# Patient Record
Sex: FEMALE | Race: WHITE | NOT HISPANIC OR LATINO
[De-identification: names, ages, dates, MRNs, and addresses within clinical notes are randomized per-mention and may not be internally consistent; named-entity substitution may affect disease eponyms.]

---

## 2022-07-25 PROBLEM — Z00.00 ENCOUNTER FOR PREVENTIVE HEALTH EXAMINATION: Status: ACTIVE | Noted: 2022-07-25

## 2022-08-17 ENCOUNTER — NON-APPOINTMENT (OUTPATIENT)
Age: 47
End: 2022-08-17

## 2022-08-17 ENCOUNTER — APPOINTMENT (OUTPATIENT)
Dept: INFECTIOUS DISEASE | Facility: CLINIC | Age: 47
End: 2022-08-17

## 2022-08-17 VITALS
TEMPERATURE: 99 F | HEART RATE: 82 BPM | OXYGEN SATURATION: 99 % | BODY MASS INDEX: 21.44 KG/M2 | RESPIRATION RATE: 15 BRPM | HEIGHT: 63 IN | WEIGHT: 121 LBS | DIASTOLIC BLOOD PRESSURE: 82 MMHG | SYSTOLIC BLOOD PRESSURE: 118 MMHG

## 2022-08-17 DIAGNOSIS — M25.512 PAIN IN LEFT SHOULDER: ICD-10-CM

## 2022-08-17 DIAGNOSIS — M25.50 PAIN IN UNSPECIFIED JOINT: ICD-10-CM

## 2022-08-17 DIAGNOSIS — D69.3 IMMUNE THROMBOCYTOPENIC PURPURA: ICD-10-CM

## 2022-08-17 PROCEDURE — 99242 OFF/OP CONSLTJ NEW/EST SF 20: CPT

## 2022-08-17 RX ORDER — DOXYCYCLINE HYCLATE 100 MG/1
100 CAPSULE ORAL
Qty: 28 | Refills: 0 | Status: ACTIVE | COMMUNITY
Start: 2022-08-17 | End: 1900-01-01

## 2022-08-17 NOTE — PHYSICAL EXAM
[General Appearance - Alert] : alert [General Appearance - In No Acute Distress] : in no acute distress [General Appearance - Well Nourished] : well nourished [General Appearance - Well-Appearing] : healthy appearing [PERRL With Normal Accommodation] : pupils were equal in size, round, reactive to light [Extraocular Movements] : extraocular movements were intact [Hearing Threshold Finger Rub Not [place]] : hearing was normal [Neck Appearance] : the appearance of the neck was normal [Neck Cervical Mass (___cm)] : no neck mass was observed [Respiration, Rhythm And Depth] : normal respiratory rhythm and effort [Auscultation Breath Sounds / Voice Sounds] : lungs were clear to auscultation bilaterally [Heart Rate And Rhythm] : heart rate was normal and rhythm regular [Heart Sounds] : normal S1 and S2 [Edema] : there was no peripheral edema [Abdomen Soft] : soft [No Palpable Adenopathy] : no palpable adenopathy [Musculoskeletal - Swelling] : no joint swelling [Skin Color & Pigmentation] : normal skin color and pigmentation [] : no rash [Cranial Nerves] : cranial nerves 2-12 were intact [Deep Tendon Reflexes (DTR)] : deep tendon reflexes were 2+ and symmetric [No Focal Deficits] : no focal deficits [Oriented To Time, Place, And Person] : oriented to person, place, and time [Affect] : the affect was normal

## 2022-08-17 NOTE — HISTORY OF PRESENT ILLNESS
[FreeTextEntry1] : 46-year-old female referred by her hematologist today for evaluation of Lyme disease.  Patient has a history of underlying ITP diagnosed during pregnancy that responded to treatment with Rituxan and has not any current treatment.  She states she had Lyme disease in June 2021 after coming down with an acute febrile illness with myalgias she does not recall having a rash at that time Lyme testing was ultimately positive and she was treated with over 4-week course of oral doxycycline with resolution of symptoms.  She notes that over the last month she has had increasing stiffness in her arms and legs finding it hard time sometimes to get out of a chair.  She has pain in her ankles knees and elbows.  Repeat Lyme serology was done in the hematology office which showed a positive Western blot IgG and a negative Western blot IgM.\par Patient is a third  who lives in [place] she herself does not recall ever having tick bites she does often also visit her parents who live in [place] and [facility] area and their yard is heavily [place].  She has 2 teenage children she is  she is a non-smoker no history of substance abuse.\par Family history notable for father with rheumatoid arthritis as well as a history of pancreatic cancer.\par She has no antibiotic allergies.

## 2022-08-17 NOTE — REASON FOR VISIT
[Consultation] : a consultation visit [FreeTextEntry1] : New pt referred by Hematologist/[facility], Gigi Coyle, for + Lyme IgG/ hx Lyme..\par reports she was on Doxycycline x 1-2 months in June 2021; not currently on antibiotics\par c/o joint pains (BL ankles, knees, and elbows) since March 2022- otherwise feels well

## 2022-08-17 NOTE — ASSESSMENT
[FreeTextEntry1] : 46-year-old female with a history of ITP not currently active having been treated with Rituxan many years ago and a prior history of Lyme disease in 2021 has persistent arthritis and discomfort in multiple joints.  Laboratory data was reviewed extensively and discussed with the patient is unclear whether this represents ongoing seropositivity from previous infection versus new infection as she resides in an endemic area.  I suspect this is not likely reactivated or chronic Lyme disease.  Given the fact that the symptoms have recurred recently and given the seasonal occurrence which is consistent with when Lyme incidence is at its highest risk-benefit ratio favors treating the patient with a course of antibiotics.\par \par Recommendations\par 1.  Doxycycline 100 mg p.o. twice daily for 14 days\par 2.  Would hold on drawing further Lyme studies\par 3.  If patient's symptoms persist despite retreatment for Lyme disease she may need further work-up by rheumatologist as there is a history of autoimmune disease in the family.\par But was discussed with the patient agrees with the plan to return to the office in 3 weeks

## 2022-08-17 NOTE — REVIEW OF SYSTEMS
[As Noted in HPI] : as noted in HPI [Joint Stiffness] : joint stiffness [Limb Pain] : limb pain [Fever] : no fever [Body Aches] : no body aches [Feeling Tired] : not feeling tired [Feeling Sick] : not feeling sick [Eyesight Problems] : no eyesight problems [Sore Throat] : no sore throat [Hoarseness] : no hoarseness [Chest Pain] : no chest pain [Palpitations] : no palpitations [Shortness Of Breath] : no shortness of breath [Wheezing] : no wheezing [Cough] : no cough [Abdominal Pain] : no abdominal pain [Constipation] : no constipation [Diarrhea] : no diarrhea [Joint Swelling] : no joint swelling [Skin Lesions] : no skin lesions [Skin Wound] : no skin wound [Confused] : no confusion [Dizziness] : no dizziness [Limb Weakness] : no limb weakness [Anxiety] : no anxiety [Swollen Glands] : no swollen glands

## 2022-09-07 ENCOUNTER — APPOINTMENT (OUTPATIENT)
Dept: INFECTIOUS DISEASE | Facility: CLINIC | Age: 47
End: 2022-09-07

## 2022-09-07 VITALS
DIASTOLIC BLOOD PRESSURE: 68 MMHG | SYSTOLIC BLOOD PRESSURE: 106 MMHG | HEART RATE: 80 BPM | TEMPERATURE: 98.2 F | OXYGEN SATURATION: 99 %

## 2022-09-07 DIAGNOSIS — Z86.19 PERSONAL HISTORY OF OTHER INFECTIOUS AND PARASITIC DISEASES: ICD-10-CM

## 2022-09-07 PROCEDURE — 99213 OFFICE O/P EST LOW 20 MIN: CPT

## 2022-09-07 RX ORDER — DOXYCYCLINE HYCLATE 100 MG/1
100 CAPSULE ORAL
Qty: 28 | Refills: 0 | Status: ACTIVE | COMMUNITY
Start: 2022-09-07 | End: 1900-01-01

## 2022-09-07 NOTE — HISTORY OF PRESENT ILLNESS
[FreeTextEntry1] : Patient presents the office today for follow-up.  She is a 46-year-old female who was seen by me 2 weeks ago and prescribed a 2-week course of oral doxycycline for Lyme infection.\par While on the antibiotic she had intermittent times where she had complete resolution of her joint pain but it never completely resolved.\par She has residual pain in her knees symmetrically bilaterally as well as ankles and occasionally shoulders.\par She has had no fever weight loss or night sweats.  She has tolerated the doxycycline without any significant GI effects.

## 2022-09-07 NOTE — REASON FOR VISIT
[Follow-Up: _____] : a [unfilled] follow-up visit [FreeTextEntry1] : 3 week fu, Lyme\par pt completed 14 day course of Doxycycline\par still c/o joint pains

## 2022-09-07 NOTE — ASSESSMENT
[FreeTextEntry1] : 46-year-old female with a history of ITP not currently active having been treated with Rituxan many years ago and a prior history of Lyme disease in 2021 has persistent arthritis and discomfort in multiple joints. Laboratory data was reviewed extensively and discussed with the patient is unclear whether this represents ongoing seropositivity from previous infection versus new infection as she resides in an endemic area. I suspect this is not likely reactivated or chronic Lyme disease\par Her symptoms did not completely respond to doxycycline and I suspect she may have some underlying other arthritic condition such as an autoimmune disease.\par \par Recommendations\par 1.  14 more days of doxycycline to complete a 28-day course\par 2.  Referral was given for rheumatology evaluation regarding possible underlying autoimmune diseases explain the symmetric joint pain involving multiple joints\par \par She will follow-up here on an as-needed basis.  Case discussed with the patient and all questions were answered.

## 2022-09-07 NOTE — PHYSICAL EXAM
[General Appearance - Alert] : alert [General Appearance - In No Acute Distress] : in no acute distress [General Appearance - Well Nourished] : well nourished [Sclera] : the sclera and conjunctiva were normal [Hearing Threshold Finger Rub Not [place]] : hearing was normal [Neck Appearance] : the appearance of the neck was normal [Respiration, Rhythm And Depth] : normal respiratory rhythm and effort [Heart Rate And Rhythm] : heart rate was normal and rhythm regular [Heart Sounds] : normal S1 and S2 [Abdomen Soft] : soft [Abdomen Tenderness] : non-tender [Musculoskeletal - Swelling] : no joint swelling [Motor Tone] : muscle strength and tone were normal [] : no rash [Affect] : the affect was normal

## 2022-09-07 NOTE — REVIEW OF SYSTEMS
[Normal Appetite] : normal appetite [As Noted in HPI] : as noted in HPI [Negative] : Genitourinary [Fever] : no fever [Chills] : no chills [Feeling Tired] : not feeling tired [Feeling Sick] : not feeling sick [Skin Wound] : no skin wound [Itching] : no itching [Easy Bruising] : no tendency for easy bruising [Swollen Glands] : no swollen glands

## 2023-11-27 ENCOUNTER — APPOINTMENT (OUTPATIENT)
Dept: ORTHOPEDIC SURGERY | Facility: CLINIC | Age: 48
End: 2023-11-27
Payer: COMMERCIAL

## 2023-11-27 VITALS — HEIGHT: 60 IN | BODY MASS INDEX: 23.75 KG/M2 | WEIGHT: 121 LBS

## 2023-11-27 DIAGNOSIS — Z78.9 OTHER SPECIFIED HEALTH STATUS: ICD-10-CM

## 2023-11-27 PROCEDURE — 99203 OFFICE O/P NEW LOW 30 MIN: CPT

## 2023-11-27 RX ORDER — CELECOXIB 200 MG/1
200 CAPSULE ORAL
Refills: 0 | Status: ACTIVE | COMMUNITY

## 2023-12-05 ENCOUNTER — RESULT REVIEW (OUTPATIENT)
Age: 48
End: 2023-12-05

## 2023-12-05 ENCOUNTER — NON-APPOINTMENT (OUTPATIENT)
Age: 48
End: 2023-12-05

## 2023-12-15 ENCOUNTER — APPOINTMENT (OUTPATIENT)
Dept: ORTHOPEDIC SURGERY | Facility: CLINIC | Age: 48
End: 2023-12-15
Payer: COMMERCIAL

## 2023-12-15 PROCEDURE — 99213 OFFICE O/P EST LOW 20 MIN: CPT

## 2023-12-15 NOTE — HISTORY OF PRESENT ILLNESS
[de-identified] : 12/15/2023: Follow up patient is here today for her left knee MRI results. States on 12/05/2023 she twisted her left knee and went to [facility] for a xray, they gave her a brace. Admits to taking Celebrex every day with some relief. knee still bothering her.  Date of initial evaluation is 11/27/2023 Patient age is 48  Occupation is Teacher  Body part causing symptoms is the LT knee  Symptoms began about 4 weeks ago, felt sharp knee pain after running. has not been able to run since that time. difficulty bending the knee.  Location of pain is medial Quality of pain is dull Pain score at rest is 2/10 Pain score during activity is 8/10 Radicular symptoms are not present Prior treatments include rest and celebrex without relief She had outside XR Patient's condition is not associated with workers compensation, no-fault or interscholastic athletics

## 2023-12-15 NOTE — IMAGING
[Left] : left knee [All Views] : anteroposterior, lateral, skyline, and anteroposterior standing [Outside films reviewed] : Outside films reviewed [There are no fractures, subluxations or dislocations. No significant abnormalities are seen] : There are no fractures, subluxations or dislocations. No significant abnormalities are seen [de-identified] : (Exam: Knee)   Laterality is left   Patient is in no acute distress, alert and oriented Sensation is grossly intact to light touch in the foot Motor function is 5/5 in the foot Capillary refill is less than 2 seconds in the toes Lymphadenopathy is not present Peripheral edema is not present   Skin is intact Effusion is not present Atrophy is not present Antalgic gait is not present   Medial joint line tenderness is present Lateral joint line tenderness is not present Patellar tenderness is not present Calf tenderness is not present   Knee extension is 0 Knee flexion is 135   Quadriceps strength is 5/5 Hamstring strength is 5/5   Lachman is normal Posterior drawer is normal Varus stress stability is normal Valgus stress stability is normal   Medial Adele test is abnormal Lateral Adele test is normal Patellofemoral grind test is normal Patellar apprehension test is normal

## 2023-12-15 NOTE — DISCUSSION/SUMMARY
[de-identified] : History, clinical examination and imaging were most consistent with: -left knee medial meniscus root tear  The diagnosis was explained in detail. The potential non-surgical and surgical treatments were reviewed. The relative risks and benefits of each option were considered relative to the patients age, activity level, medical history, symptom severity and previously attempted treatments.  The patient was advised to consult with their primary medical provider prior to initiation of any new medications to reduce the risk of adverse effects specific to their long-term home medications and medical history. The risk of gastrointestinal irritation and kidney injury specific to long-term NSAID use was discussed.  -Discussed option for medial meniscal root repair with partial lateral meniscectomy, patient would like to attempt non-surgical treatment at this time. -Patient to begin formal PT. -Discussed association of meniscal root tears with future osteoarthritis. -Return to activity as symptoms permit. -Celebrex as needed for pain. -Follow up with Dr. Cabrera in 6 weeks, if symptoms persist would likely revisit surgical option.   (MDM)  Problem Complexity -Moderate: acute, complicated injury

## 2023-12-15 NOTE — DATA REVIEWED
[MRI] : MRI [Left] : left [Knee] : knee [Report was reviewed and noted in the chart] : The report was reviewed and noted in the chart [I reviewed the films/CD and agree] : I reviewed the films/CD and agree [FreeTextEntry1] : medial meniscus root tear, degenerative tear anterior horn lateral meniscus, low grade ACL sprain, small ganglion cyst

## 2024-01-24 ENCOUNTER — APPOINTMENT (OUTPATIENT)
Dept: ORTHOPEDIC SURGERY | Facility: CLINIC | Age: 49
End: 2024-01-24

## 2024-01-30 ENCOUNTER — APPOINTMENT (OUTPATIENT)
Dept: ORTHOPEDIC SURGERY | Facility: CLINIC | Age: 49
End: 2024-01-30
Payer: COMMERCIAL

## 2024-01-30 ENCOUNTER — TRANSCRIPTION ENCOUNTER (OUTPATIENT)
Age: 49
End: 2024-01-30

## 2024-01-30 VITALS — BODY MASS INDEX: 23.75 KG/M2 | WEIGHT: 121 LBS | HEIGHT: 60 IN

## 2024-01-30 DIAGNOSIS — S83.242A OTHER TEAR OF MEDIAL MENISCUS, CURRENT INJURY, LEFT KNEE, INITIAL ENCOUNTER: ICD-10-CM

## 2024-01-30 PROCEDURE — 99204 OFFICE O/P NEW MOD 45 MIN: CPT

## 2024-01-30 NOTE — DISCUSSION/SUMMARY
[de-identified] : I spent time going in detail the problem and the associated risks/benefits of left arthroscopy surgery. Went into detailed discussion of complications including but not limited to, nerve injury, non-union, repeat fracture, DVT /PE /pe postop, instability, transfusion, infection, NVA injury, stiffness, leg length discrepancy, inability to ambulate & death. Discussed implant and model shown to patient. Patient had ample time to ask any questions, and at this time answered all patient questions, should anymore arise they were instructed to follow up. Patient expressed understanding of the proposed procedure and postop directions. Patient has failed non-surgical treatment and PT is contraindicated at this time.  discussed about repair vs debridement and arthroscopy patient will call PRN for surgery. Discussed about PT as well.

## 2024-01-30 NOTE — REASON FOR VISIT
[FreeTextEntry2] : HERE FOR F/U LEFT KNEE PAIN.  SAW DR SINGLETON IN 12/15/23.  HAS BEEN GOING TO PT UNTIL LAST WEEK, USING CELEBREX. C/O WORKING OUT AND WALKING IS PAINFUL.  STATES THE KNEE IS NOT GIVING OUT ON HER NOW.

## 2024-01-30 NOTE — PHYSICAL EXAM
[Left] : left knee [NL (0)] : extension 0 degrees [5___] : hamstring 5[unfilled]/5 [Positive] : positive Valerie [] : patient ambulates without assistive device [FreeTextEntry9] : tight with flexion  [de-identified] : NORMAL SINGLE STEP  [TWNoteComboBox7] : flexion 130 degrees

## 2024-01-30 NOTE — DATA REVIEWED
[FreeTextEntry1] : the lateral meniscus tear is associated with a ganglion cyst which abuts adjacent infrapatellar fat.